# Patient Record
Sex: FEMALE | Race: WHITE | Employment: OTHER | URBAN - METROPOLITAN AREA
[De-identification: names, ages, dates, MRNs, and addresses within clinical notes are randomized per-mention and may not be internally consistent; named-entity substitution may affect disease eponyms.]

---

## 2017-01-01 ENCOUNTER — APPOINTMENT (OUTPATIENT)
Dept: CT IMAGING | Age: 72
DRG: 871 | End: 2017-01-01
Attending: NURSE PRACTITIONER
Payer: MEDICARE

## 2017-01-01 ENCOUNTER — APPOINTMENT (OUTPATIENT)
Dept: GENERAL RADIOLOGY | Age: 72
DRG: 871 | End: 2017-01-01
Attending: EMERGENCY MEDICINE
Payer: MEDICARE

## 2017-01-01 ENCOUNTER — HOSPITAL ENCOUNTER (INPATIENT)
Age: 72
LOS: 1 days | DRG: 871 | End: 2017-03-15
Attending: EMERGENCY MEDICINE | Admitting: INTERNAL MEDICINE
Payer: MEDICARE

## 2017-01-01 ENCOUNTER — APPOINTMENT (OUTPATIENT)
Dept: GENERAL RADIOLOGY | Age: 72
DRG: 871 | End: 2017-01-01
Attending: INTERNAL MEDICINE
Payer: MEDICARE

## 2017-01-01 VITALS
OXYGEN SATURATION: 95 % | SYSTOLIC BLOOD PRESSURE: 62 MMHG | WEIGHT: 188 LBS | BODY MASS INDEX: 33.31 KG/M2 | DIASTOLIC BLOOD PRESSURE: 36 MMHG | HEIGHT: 63 IN | TEMPERATURE: 96.5 F

## 2017-01-01 DIAGNOSIS — E87.20 METABOLIC ACIDOSIS: ICD-10-CM

## 2017-01-01 DIAGNOSIS — N17.9 ACUTE ON CHRONIC RENAL FAILURE (HCC): ICD-10-CM

## 2017-01-01 DIAGNOSIS — J44.9 CHRONIC OBSTRUCTIVE PULMONARY DISEASE, UNSPECIFIED COPD TYPE (HCC): Chronic | ICD-10-CM

## 2017-01-01 DIAGNOSIS — E87.20 LACTIC ACIDOSIS: ICD-10-CM

## 2017-01-01 DIAGNOSIS — I46.9 CARDIAC ARREST (HCC): Primary | ICD-10-CM

## 2017-01-01 DIAGNOSIS — A41.9 SEPTIC SHOCK (HCC): ICD-10-CM

## 2017-01-01 DIAGNOSIS — J96.20 ACUTE ON CHRONIC RESPIRATORY FAILURE, UNSPECIFIED WHETHER WITH HYPOXIA OR HYPERCAPNIA (HCC): ICD-10-CM

## 2017-01-01 DIAGNOSIS — Z85.118 HISTORY OF LUNG CANCER: Chronic | ICD-10-CM

## 2017-01-01 DIAGNOSIS — D64.9 ANEMIA, UNSPECIFIED TYPE: ICD-10-CM

## 2017-01-01 DIAGNOSIS — R91.8 MASS OF RIGHT LUNG: ICD-10-CM

## 2017-01-01 DIAGNOSIS — E87.5 HYPERKALEMIA: ICD-10-CM

## 2017-01-01 DIAGNOSIS — N18.9 ACUTE ON CHRONIC RENAL FAILURE (HCC): ICD-10-CM

## 2017-01-01 DIAGNOSIS — R65.21 SEPTIC SHOCK (HCC): ICD-10-CM

## 2017-01-01 LAB
ALBUMIN SERPL BCP-MCNC: 1.8 G/DL (ref 3.2–4.6)
ALBUMIN/GLOB SERPL: 0.4 {RATIO} (ref 1.2–3.5)
ALP SERPL-CCNC: 161 U/L (ref 50–136)
ALT SERPL-CCNC: 350 U/L (ref 12–65)
ANION GAP BLD CALC-SCNC: 20 MMOL/L (ref 7–16)
ANION GAP BLD CALC-SCNC: 24 MMOL/L (ref 7–16)
ARTERIAL PATENCY WRIST A: ABNORMAL
ARTERIAL PATENCY WRIST A: ABNORMAL
ARTERIAL PATENCY WRIST A: POSITIVE
AST SERPL W P-5'-P-CCNC: 1429 U/L (ref 15–37)
ATRIAL RATE: 89 BPM
BACTERIA SPEC CULT: ABNORMAL
BACTERIA SPEC CULT: ABNORMAL
BASE DEFICIT BLDA-SCNC: 13.7 MMOL/L (ref 0–2)
BASE DEFICIT BLDA-SCNC: 8.7 MMOL/L (ref 0–2)
BASE DEFICIT BLDV-SCNC: 10.3 MMOL/L
BDY SITE: ABNORMAL
BILIRUB SERPL-MCNC: 1.7 MG/DL (ref 0.2–1.1)
BUN SERPL-MCNC: 56 MG/DL (ref 8–23)
BUN SERPL-MCNC: 59 MG/DL (ref 8–23)
CA-I BLD-SCNC: 0.91 MMOL/L (ref 1–1.3)
CA-I BLD-SCNC: 1.03 MMOL/L (ref 1–1.3)
CALCIUM SERPL-MCNC: 7.5 MG/DL (ref 8.3–10.4)
CALCIUM SERPL-MCNC: 8.4 MG/DL (ref 8.3–10.4)
CALCULATED P AXIS, ECG09: 83 DEGREES
CALCULATED R AXIS, ECG10: 104 DEGREES
CALCULATED T AXIS, ECG11: 26 DEGREES
CHLORIDE BLDA-SCNC: 95 MMOL/L (ref 98–106)
CHLORIDE BLDA-SCNC: 98 MMOL/L (ref 98–106)
CHLORIDE SERPL-SCNC: 94 MMOL/L (ref 98–107)
CHLORIDE SERPL-SCNC: 98 MMOL/L (ref 98–107)
CO2 SERPL-SCNC: 18 MMOL/L (ref 21–32)
CO2 SERPL-SCNC: 20 MMOL/L (ref 21–32)
COHGB MFR BLD: 0.1 % (ref 0.5–1.5)
COHGB MFR BLD: 0.2 % (ref 0.5–1.5)
COHGB MFR BLD: 0.3 % (ref 0.5–1.5)
CREAT SERPL-MCNC: 3.96 MG/DL (ref 0.6–1)
CREAT SERPL-MCNC: 4.09 MG/DL (ref 0.6–1)
DIAGNOSIS, 93000: NORMAL
DIASTOLIC BP, ECG02: NORMAL MMHG
DO-HGB BLD-MCNC: 0 % (ref 0–5)
DO-HGB BLD-MCNC: 1 % (ref 0–5)
DO-HGB BLD-MCNC: 59 % (ref 0–5)
ERYTHROCYTE [DISTWIDTH] IN BLOOD BY AUTOMATED COUNT: 17.6 % (ref 11.9–14.6)
FIO2 ON VENT: 100 %
FIO2 ON VENT: 100 %
FIO2 ON VENT: 50 %
GLOBULIN SER CALC-MCNC: 4.1 G/DL (ref 2.3–3.5)
GLUCOSE SERPL-MCNC: 34 MG/DL (ref 65–100)
GLUCOSE SERPL-MCNC: 63 MG/DL (ref 65–100)
HCO3 BLDA-SCNC: 13 MMOL/L (ref 22–26)
HCO3 BLDA-SCNC: 14 MMOL/L (ref 22–26)
HCO3 BLDV-SCNC: 17 MMOL/L
HCT VFR BLD AUTO: 23.8 % (ref 35.8–46.3)
HGB BLD-MCNC: 6.7 G/DL (ref 11.7–15.4)
HGB BLD-MCNC: 7.9 G/DL (ref 11.7–15.4)
HGB BLDMV-MCNC: 8.6 GM/DL (ref 11.7–15)
HGB BLDMV-MCNC: 8.7 GM/DL (ref 11.7–15)
HGB BLDMV-MCNC: 8.8 GM/DL (ref 11.7–15)
LACTATE BLD-SCNC: 14.8 MMOL/L (ref 0.5–1.9)
MAGNESIUM SERPL-MCNC: 2.8 MG/DL (ref 1.8–2.4)
MCH RBC QN AUTO: 27.7 PG (ref 26.1–32.9)
MCHC RBC AUTO-ENTMCNC: 28.2 G/DL (ref 31.4–35)
MCV RBC AUTO: 98.3 FL (ref 79.6–97.8)
METHGB MFR BLD: 0.4 % (ref 0–1.5)
METHGB MFR BLD: 0.7 % (ref 0–1.5)
METHGB MFR BLD: 0.8 % (ref 0–1.5)
OXYHGB MFR BLDA: 98.4 % (ref 94–97)
OXYHGB MFR BLDA: 98.8 % (ref 94–97)
OXYHGB MFR BLDV: 40.1 %
P-R INTERVAL, ECG05: 180 MS
PCO2 BLDA: 18 MMHG (ref 35–45)
PCO2 BLDA: 39 MMHG (ref 35–45)
PCO2 BLDV: 47.2 MMHG
PEEP RESPIRATORY: 8 CM[H2O]
PEEP RESPIRATORY: 8 CM[H2O]
PH BLDA: 7.17 [PH] (ref 7.35–7.45)
PH BLDA: 7.48 [PH] (ref 7.35–7.45)
PH BLDV: 7.18 [PH]
PLATELET # BLD AUTO: 213 K/UL (ref 150–450)
PMV BLD AUTO: 10.7 FL (ref 10.8–14.1)
PO2 BLDA: 262 MMHG (ref 75–100)
PO2 BLDA: 415 MMHG (ref 75–100)
PO2 BLDV: <34 MMHG
POTASSIUM BLDA-SCNC: 5.36 MMOL/L (ref 3.5–5.3)
POTASSIUM BLDA-SCNC: 5.87 MMOL/L (ref 3.5–5.3)
POTASSIUM SERPL-SCNC: 5.4 MMOL/L (ref 3.5–5.1)
POTASSIUM SERPL-SCNC: 5.9 MMOL/L (ref 3.5–5.1)
PROT SERPL-MCNC: 5.9 G/DL (ref 6.3–8.2)
Q-T INTERVAL, ECG07: 432 MS
QRS DURATION, ECG06: 148 MS
QTC CALCULATION (BEZET), ECG08: 525 MS
RBC # BLD AUTO: 2.42 M/UL (ref 4.05–5.25)
RESP RATE: 14
RESP RATE: 16
SAO2 % BLD: 100 % (ref 92–98.5)
SAO2 % BLD: 99 % (ref 92–98.5)
SAO2 % BLDV: 41 %
SERVICE CMNT-IMP: ABNORMAL
SODIUM BLDA-SCNC: 131.7 MMOL/L (ref 135–148)
SODIUM BLDA-SCNC: 131.8 MMOL/L (ref 135–148)
SODIUM SERPL-SCNC: 136 MMOL/L (ref 136–145)
SODIUM SERPL-SCNC: 138 MMOL/L (ref 136–145)
SYSTOLIC BP, ECG01: NORMAL MMHG
TROPONIN I BLD-MCNC: 0.59 NG/ML (ref 0–0.08)
VENTILATION MODE VENT: ABNORMAL
VENTRICULAR RATE, ECG03: 89 BPM
VT SETTING VENT: 450 ML
VT SETTING VENT: 500 ML
WBC # BLD AUTO: 14.4 K/UL (ref 4.3–11.1)

## 2017-01-01 PROCEDURE — 74011250636 HC RX REV CODE- 250/636: Performed by: INTERNAL MEDICINE

## 2017-01-01 PROCEDURE — 74011000250 HC RX REV CODE- 250: Performed by: EMERGENCY MEDICINE

## 2017-01-01 PROCEDURE — 82803 BLOOD GASES ANY COMBINATION: CPT

## 2017-01-01 PROCEDURE — C1751 CATH, INF, PER/CENT/MIDLINE: HCPCS

## 2017-01-01 PROCEDURE — 85027 COMPLETE CBC AUTOMATED: CPT | Performed by: EMERGENCY MEDICINE

## 2017-01-01 PROCEDURE — 94002 VENT MGMT INPAT INIT DAY: CPT

## 2017-01-01 PROCEDURE — 36556 INSERT NON-TUNNEL CV CATH: CPT | Performed by: INTERNAL MEDICINE

## 2017-01-01 PROCEDURE — 86870 RBC ANTIBODY IDENTIFICATION: CPT | Performed by: EMERGENCY MEDICINE

## 2017-01-01 PROCEDURE — 74011250636 HC RX REV CODE- 250/636: Performed by: EMERGENCY MEDICINE

## 2017-01-01 PROCEDURE — 99239 HOSP IP/OBS DSCHRG MGMT >30: CPT | Performed by: INTERNAL MEDICINE

## 2017-01-01 PROCEDURE — 99285 EMERGENCY DEPT VISIT HI MDM: CPT | Performed by: EMERGENCY MEDICINE

## 2017-01-01 PROCEDURE — 5A1935Z RESPIRATORY VENTILATION, LESS THAN 24 CONSECUTIVE HOURS: ICD-10-PCS | Performed by: EMERGENCY MEDICINE

## 2017-01-01 PROCEDURE — 92950 HEART/LUNG RESUSCITATION CPR: CPT | Performed by: EMERGENCY MEDICINE

## 2017-01-01 PROCEDURE — 80053 COMPREHEN METABOLIC PANEL: CPT | Performed by: NURSE PRACTITIONER

## 2017-01-01 PROCEDURE — 86900 BLOOD TYPING SEROLOGIC ABO: CPT | Performed by: EMERGENCY MEDICINE

## 2017-01-01 PROCEDURE — 71010 XR CHEST PORT: CPT

## 2017-01-01 PROCEDURE — 83605 ASSAY OF LACTIC ACID: CPT

## 2017-01-01 PROCEDURE — 05HM33Z INSERTION OF INFUSION DEVICE INTO RIGHT INTERNAL JUGULAR VEIN, PERCUTANEOUS APPROACH: ICD-10-PCS | Performed by: INTERNAL MEDICINE

## 2017-01-01 PROCEDURE — 86902 BLOOD TYPE ANTIGEN DONOR EA: CPT | Performed by: EMERGENCY MEDICINE

## 2017-01-01 PROCEDURE — 74176 CT ABD & PELVIS W/O CONTRAST: CPT

## 2017-01-01 PROCEDURE — 93005 ELECTROCARDIOGRAM TRACING: CPT | Performed by: EMERGENCY MEDICINE

## 2017-01-01 PROCEDURE — 77030012183 HC CATH CV KT TELE -B

## 2017-01-01 PROCEDURE — 96375 TX/PRO/DX INJ NEW DRUG ADDON: CPT | Performed by: EMERGENCY MEDICINE

## 2017-01-01 PROCEDURE — 84484 ASSAY OF TROPONIN QUANT: CPT

## 2017-01-01 PROCEDURE — C8924 2D TTE W OR W/O FOL W/CON,FU: HCPCS

## 2017-01-01 PROCEDURE — 74011000250 HC RX REV CODE- 250: Performed by: INTERNAL MEDICINE

## 2017-01-01 PROCEDURE — 65610000001 HC ROOM ICU GENERAL

## 2017-01-01 PROCEDURE — 36600 WITHDRAWAL OF ARTERIAL BLOOD: CPT

## 2017-01-01 PROCEDURE — 77030031476 HC EXCH HEAT MOISTW FLTR HALY -A

## 2017-01-01 PROCEDURE — 96365 THER/PROPH/DIAG IV INF INIT: CPT | Performed by: EMERGENCY MEDICINE

## 2017-01-01 PROCEDURE — 86920 COMPATIBILITY TEST SPIN: CPT | Performed by: EMERGENCY MEDICINE

## 2017-01-01 PROCEDURE — B543ZZA ULTRASONOGRAPHY OF RIGHT JUGULAR VEINS, GUIDANCE: ICD-10-PCS | Performed by: INTERNAL MEDICINE

## 2017-01-01 PROCEDURE — 87641 MR-STAPH DNA AMP PROBE: CPT | Performed by: INTERNAL MEDICINE

## 2017-01-01 PROCEDURE — 99223 1ST HOSP IP/OBS HIGH 75: CPT | Performed by: INTERNAL MEDICINE

## 2017-01-01 PROCEDURE — 74011000258 HC RX REV CODE- 258: Performed by: INTERNAL MEDICINE

## 2017-01-01 PROCEDURE — 80048 BASIC METABOLIC PNL TOTAL CA: CPT | Performed by: EMERGENCY MEDICINE

## 2017-01-01 PROCEDURE — 36556 INSERT NON-TUNNEL CV CATH: CPT

## 2017-01-01 PROCEDURE — 94003 VENT MGMT INPAT SUBQ DAY: CPT

## 2017-01-01 PROCEDURE — 80051 ELECTROLYTE PANEL: CPT

## 2017-01-01 PROCEDURE — 71250 CT THORAX DX C-: CPT

## 2017-01-01 PROCEDURE — 83735 ASSAY OF MAGNESIUM: CPT | Performed by: EMERGENCY MEDICINE

## 2017-01-01 PROCEDURE — 85018 HEMOGLOBIN: CPT | Performed by: NURSE PRACTITIONER

## 2017-01-01 PROCEDURE — 74011000250 HC RX REV CODE- 250

## 2017-01-01 PROCEDURE — 86905 BLOOD TYPING RBC ANTIGENS: CPT | Performed by: EMERGENCY MEDICINE

## 2017-01-01 RX ORDER — ACETAMINOPHEN 325 MG/1
650 TABLET ORAL
Status: DISCONTINUED | OUTPATIENT
Start: 2017-01-01 | End: 2017-01-01

## 2017-01-01 RX ORDER — MORPHINE SULFATE 2 MG/ML
5 INJECTION, SOLUTION INTRAMUSCULAR; INTRAVENOUS
Status: DISCONTINUED | OUTPATIENT
Start: 2017-01-01 | End: 2017-01-01 | Stop reason: HOSPADM

## 2017-01-01 RX ORDER — VANCOMYCIN HYDROCHLORIDE
1250 ONCE
Status: DISCONTINUED | OUTPATIENT
Start: 2017-01-01 | End: 2017-01-01

## 2017-01-01 RX ORDER — DEXTROSE 50 % IN WATER (D50W) INTRAVENOUS SYRINGE
Status: COMPLETED | OUTPATIENT
Start: 2017-01-01 | End: 2017-01-01

## 2017-01-01 RX ORDER — SODIUM CHLORIDE 9 MG/ML
250 INJECTION, SOLUTION INTRAVENOUS AS NEEDED
Status: DISCONTINUED | OUTPATIENT
Start: 2017-01-01 | End: 2017-01-01

## 2017-01-01 RX ORDER — DEXTROSE 50 % IN WATER (D50W) INTRAVENOUS SYRINGE
Status: COMPLETED
Start: 2017-01-01 | End: 2017-01-01

## 2017-01-01 RX ORDER — NOREPINEPHRINE BITARTRATE/D5W 4MG/250ML
2-16 PLASTIC BAG, INJECTION (ML) INTRAVENOUS ONCE
Status: DISCONTINUED | OUTPATIENT
Start: 2017-01-01 | End: 2017-01-01 | Stop reason: DRUGHIGH

## 2017-01-01 RX ORDER — MIDAZOLAM HYDROCHLORIDE 1 MG/ML
2 INJECTION, SOLUTION INTRAMUSCULAR; INTRAVENOUS
Status: DISCONTINUED | OUTPATIENT
Start: 2017-01-01 | End: 2017-01-01 | Stop reason: HOSPADM

## 2017-01-01 RX ORDER — SODIUM BICARBONATE 1 MEQ/ML
SYRINGE (ML) INTRAVENOUS
Status: COMPLETED | OUTPATIENT
Start: 2017-01-01 | End: 2017-01-01

## 2017-01-01 RX ORDER — DOPAMINE HYDROCHLORIDE 320 MG/100ML
INJECTION, SOLUTION INTRAVENOUS
Status: COMPLETED | OUTPATIENT
Start: 2017-01-01 | End: 2017-01-01

## 2017-01-01 RX ORDER — SODIUM POLYSTYRENE SULFONATE 15 G/60ML
30 SUSPENSION ORAL; RECTAL
Status: DISCONTINUED | OUTPATIENT
Start: 2017-01-01 | End: 2017-01-01

## 2017-01-01 RX ORDER — MIDAZOLAM HYDROCHLORIDE 1 MG/ML
5 INJECTION, SOLUTION INTRAMUSCULAR; INTRAVENOUS
Status: DISCONTINUED | OUTPATIENT
Start: 2017-01-01 | End: 2017-01-01 | Stop reason: HOSPADM

## 2017-01-01 RX ORDER — MORPHINE SULFATE 2 MG/ML
2 INJECTION, SOLUTION INTRAMUSCULAR; INTRAVENOUS
Status: DISCONTINUED | OUTPATIENT
Start: 2017-01-01 | End: 2017-01-01 | Stop reason: HOSPADM

## 2017-01-01 RX ORDER — DEXTROSE 50 % IN WATER (D50W) INTRAVENOUS SYRINGE
25 ONCE
Status: COMPLETED | OUTPATIENT
Start: 2017-01-01 | End: 2017-01-01

## 2017-01-01 RX ORDER — SODIUM CHLORIDE 0.9 % (FLUSH) 0.9 %
5-10 SYRINGE (ML) INJECTION AS NEEDED
Status: DISCONTINUED | OUTPATIENT
Start: 2017-01-01 | End: 2017-01-01

## 2017-01-01 RX ORDER — EPINEPHRINE 0.1 MG/ML
INJECTION INTRACARDIAC; INTRAVENOUS
Status: COMPLETED | OUTPATIENT
Start: 2017-01-01 | End: 2017-01-01

## 2017-01-01 RX ORDER — ATROPINE SULFATE 0.1 MG/ML
INJECTION INTRAVENOUS
Status: COMPLETED | OUTPATIENT
Start: 2017-01-01 | End: 2017-01-01

## 2017-01-01 RX ORDER — ALBUTEROL SULFATE 0.83 MG/ML
2.5 SOLUTION RESPIRATORY (INHALATION)
Status: DISCONTINUED | OUTPATIENT
Start: 2017-01-01 | End: 2017-01-01

## 2017-01-01 RX ORDER — FENTANYL CITRATE 50 UG/ML
25 INJECTION, SOLUTION INTRAMUSCULAR; INTRAVENOUS
Status: DISCONTINUED | OUTPATIENT
Start: 2017-01-01 | End: 2017-01-01

## 2017-01-01 RX ORDER — MIDAZOLAM HYDROCHLORIDE 1 MG/ML
1 INJECTION, SOLUTION INTRAMUSCULAR; INTRAVENOUS
Status: DISCONTINUED | OUTPATIENT
Start: 2017-01-01 | End: 2017-01-01

## 2017-01-01 RX ORDER — MORPHINE SULFATE 10 MG/ML
10 INJECTION, SOLUTION INTRAMUSCULAR; INTRAVENOUS
Status: DISCONTINUED | OUTPATIENT
Start: 2017-01-01 | End: 2017-01-01 | Stop reason: HOSPADM

## 2017-01-01 RX ORDER — SODIUM CHLORIDE 0.9 % (FLUSH) 0.9 %
5-10 SYRINGE (ML) INJECTION EVERY 8 HOURS
Status: DISCONTINUED | OUTPATIENT
Start: 2017-01-01 | End: 2017-01-01

## 2017-01-01 RX ORDER — SODIUM CHLORIDE 9 MG/ML
100 INJECTION, SOLUTION INTRAVENOUS CONTINUOUS
Status: DISCONTINUED | OUTPATIENT
Start: 2017-01-01 | End: 2017-01-01

## 2017-01-01 RX ORDER — MORPHINE SULFATE 2 MG/ML
1 INJECTION, SOLUTION INTRAMUSCULAR; INTRAVENOUS
Status: DISCONTINUED | OUTPATIENT
Start: 2017-01-01 | End: 2017-01-01

## 2017-01-01 RX ORDER — MIDAZOLAM HYDROCHLORIDE 1 MG/ML
2 INJECTION, SOLUTION INTRAMUSCULAR; INTRAVENOUS
Status: DISCONTINUED | OUTPATIENT
Start: 2017-01-01 | End: 2017-01-01

## 2017-01-01 RX ADMIN — NOREPINEPHRINE BITARTRATE 4 MCG/MIN: 1 INJECTION INTRAVENOUS at 18:07

## 2017-01-01 RX ADMIN — DEXTROSE MONOHYDRATE 50 ML: 25 INJECTION, SOLUTION INTRAVENOUS at 14:25

## 2017-01-01 RX ADMIN — PIPERACILLIN SODIUM,TAZOBACTAM SODIUM 4.5 G: 4; .5 INJECTION, POWDER, FOR SOLUTION INTRAVENOUS at 20:00

## 2017-01-01 RX ADMIN — DOPAMINE HYDROCHLORIDE IN DEXTROSE 15 MCG/KG/MIN: 3.2 INJECTION, SOLUTION INTRAVENOUS at 14:31

## 2017-01-01 RX ADMIN — VASOPRESSIN 0.04 UNITS/MIN: 20 INJECTION INTRAVENOUS at 19:41

## 2017-01-01 RX ADMIN — NOREPINEPHRINE BITARTRATE 16 MCG/MIN: 1 INJECTION INTRAVENOUS at 20:11

## 2017-01-01 RX ADMIN — Medication 2 MG: at 02:35

## 2017-01-01 RX ADMIN — DEXTROSE 50 % IN WATER (D50W) INTRAVENOUS SYRINGE 25 G: at 21:15

## 2017-01-01 RX ADMIN — SODIUM CHLORIDE 100 ML/HR: 900 INJECTION, SOLUTION INTRAVENOUS at 18:06

## 2017-01-01 RX ADMIN — EPINEPHRINE 1 MG: 0.1 INJECTION INTRACARDIAC; INTRAVENOUS at 14:55

## 2017-01-01 RX ADMIN — EPINEPHRINE 1 MG: 0.1 INJECTION, SOLUTION ENDOTRACHEAL; INTRACARDIAC; INTRAVENOUS at 14:24

## 2017-01-01 RX ADMIN — MIDAZOLAM 2 MG: 1 INJECTION INTRAMUSCULAR; INTRAVENOUS at 21:51

## 2017-01-01 RX ADMIN — MIDAZOLAM HYDROCHLORIDE 2 MG: 1 INJECTION, SOLUTION INTRAMUSCULAR; INTRAVENOUS at 02:35

## 2017-01-01 RX ADMIN — Medication 2 MG: at 21:51

## 2017-01-01 RX ADMIN — PERFLUTREN 1 ML: 6.52 INJECTION, SUSPENSION INTRAVENOUS at 15:00

## 2017-01-01 RX ADMIN — DEXTROSE MONOHYDRATE 25 G: 25 INJECTION, SOLUTION INTRAVENOUS at 21:15

## 2017-01-01 RX ADMIN — VANCOMYCIN HYDROCHLORIDE 1250 MG: 10 INJECTION, POWDER, LYOPHILIZED, FOR SOLUTION INTRAVENOUS at 20:00

## 2017-01-01 RX ADMIN — SODIUM BICARBONATE 50 MEQ: 84 INJECTION, SOLUTION INTRAVENOUS at 14:25

## 2017-01-01 RX ADMIN — Medication 5 MG: at 02:59

## 2017-01-01 RX ADMIN — ATROPINE SULFATE 1 MG: 0.1 INJECTION, SOLUTION INTRAVENOUS at 14:24

## 2017-02-13 PROBLEM — R32 INCONTINENCE: Status: ACTIVE | Noted: 2017-01-01

## 2017-02-13 PROBLEM — R49.0 HOARSENESS: Status: ACTIVE | Noted: 2017-01-01

## 2017-02-13 PROBLEM — I10 ESSENTIAL HYPERTENSION: Status: ACTIVE | Noted: 2017-01-01

## 2017-02-13 PROBLEM — Z85.118 HISTORY OF LUNG CANCER: Status: ACTIVE | Noted: 2017-01-01

## 2017-02-13 PROBLEM — F41.9 ANXIETY: Status: ACTIVE | Noted: 2017-01-01

## 2017-02-13 PROBLEM — I73.9 PVD (PERIPHERAL VASCULAR DISEASE) (HCC): Status: ACTIVE | Noted: 2017-01-01

## 2017-02-13 PROBLEM — E78.00 HYPERCHOLESTEREMIA: Status: ACTIVE | Noted: 2017-01-01

## 2017-02-13 PROBLEM — R09.89 SINUS COMPLAINT: Status: ACTIVE | Noted: 2017-01-01

## 2017-02-13 PROBLEM — H54.7 VISION PROBLEMS: Status: ACTIVE | Noted: 2017-01-01

## 2017-02-13 PROBLEM — M25.571 ARTHRALGIA OF RIGHT FOOT: Status: ACTIVE | Noted: 2017-01-01

## 2017-02-13 PROBLEM — R06.02 SHORTNESS OF BREATH: Status: ACTIVE | Noted: 2017-01-01

## 2017-02-13 PROBLEM — J44.1 CHRONIC OBSTRUCTIVE PULMONARY DISEASE WITH ACUTE EXACERBATION (HCC): Status: ACTIVE | Noted: 2017-01-01

## 2017-02-13 PROBLEM — R53.1 WEAKNESS: Status: ACTIVE | Noted: 2017-01-01

## 2017-02-13 PROBLEM — R23.3 EASY BRUISING: Status: ACTIVE | Noted: 2017-01-01

## 2017-02-13 PROBLEM — R53.82 CHRONIC FATIGUE: Status: ACTIVE | Noted: 2017-01-01

## 2017-02-13 PROBLEM — N18.9 CKD (CHRONIC KIDNEY DISEASE): Status: ACTIVE | Noted: 2017-01-01

## 2017-02-13 PROBLEM — I50.9 CHF (CONGESTIVE HEART FAILURE) (HCC): Status: ACTIVE | Noted: 2017-01-01

## 2017-02-13 PROBLEM — R05.9 COUGH: Status: ACTIVE | Noted: 2017-01-01

## 2017-02-13 PROBLEM — R63.5 WEIGHT GAIN: Status: ACTIVE | Noted: 2017-01-01

## 2017-02-13 PROBLEM — G47.9 DIFFICULTY SLEEPING: Status: ACTIVE | Noted: 2017-01-01

## 2017-02-13 PROBLEM — I25.10 CAD (CORONARY ARTERY DISEASE): Status: ACTIVE | Noted: 2017-01-01

## 2017-02-14 PROBLEM — G89.29 CHRONIC PAIN OF BOTH KNEES: Status: ACTIVE | Noted: 2017-01-01

## 2017-02-14 PROBLEM — M25.561 CHRONIC PAIN OF BOTH KNEES: Status: ACTIVE | Noted: 2017-01-01

## 2017-02-14 PROBLEM — M25.562 CHRONIC PAIN OF BOTH KNEES: Status: ACTIVE | Noted: 2017-01-01

## 2017-03-14 PROBLEM — R06.02 SHORTNESS OF BREATH: Chronic | Status: ACTIVE | Noted: 2017-01-01

## 2017-03-14 PROBLEM — R65.21 SEPTIC SHOCK (HCC): Status: ACTIVE | Noted: 2017-01-01

## 2017-03-14 PROBLEM — R91.8 MASS OF RIGHT LUNG: Status: ACTIVE | Noted: 2017-01-01

## 2017-03-14 PROBLEM — F41.9 ANXIETY: Chronic | Status: ACTIVE | Noted: 2017-01-01

## 2017-03-14 PROBLEM — Z85.118 HISTORY OF LUNG CANCER: Chronic | Status: ACTIVE | Noted: 2017-01-01

## 2017-03-14 PROBLEM — E66.9 OBESITY (BMI 30-39.9): Chronic | Status: ACTIVE | Noted: 2017-01-01

## 2017-03-14 PROBLEM — R53.1 WEAKNESS: Chronic | Status: ACTIVE | Noted: 2017-01-01

## 2017-03-14 PROBLEM — I10 ESSENTIAL HYPERTENSION: Chronic | Status: ACTIVE | Noted: 2017-01-01

## 2017-03-14 PROBLEM — G47.9 DIFFICULTY SLEEPING: Chronic | Status: ACTIVE | Noted: 2017-01-01

## 2017-03-14 PROBLEM — I46.9 CARDIAC ARREST (HCC): Status: ACTIVE | Noted: 2017-01-01

## 2017-03-14 PROBLEM — J44.9 COPD (CHRONIC OBSTRUCTIVE PULMONARY DISEASE) (HCC): Chronic | Status: ACTIVE | Noted: 2017-01-01

## 2017-03-14 PROBLEM — E87.20 METABOLIC ACIDOSIS: Status: ACTIVE | Noted: 2017-01-01

## 2017-03-14 PROBLEM — E87.5 HYPERKALEMIA: Status: ACTIVE | Noted: 2017-01-01

## 2017-03-14 PROBLEM — I25.10 CAD (CORONARY ARTERY DISEASE): Chronic | Status: ACTIVE | Noted: 2017-01-01

## 2017-03-14 PROBLEM — N17.9 ACUTE ON CHRONIC RENAL FAILURE (HCC): Status: ACTIVE | Noted: 2017-01-01

## 2017-03-14 PROBLEM — I50.9 CHF (CONGESTIVE HEART FAILURE) (HCC): Chronic | Status: ACTIVE | Noted: 2017-01-01

## 2017-03-14 PROBLEM — I73.9 PVD (PERIPHERAL VASCULAR DISEASE) (HCC): Chronic | Status: ACTIVE | Noted: 2017-01-01

## 2017-03-14 PROBLEM — H54.7 VISION PROBLEMS: Chronic | Status: ACTIVE | Noted: 2017-01-01

## 2017-03-14 PROBLEM — E78.00 HYPERCHOLESTEREMIA: Chronic | Status: ACTIVE | Noted: 2017-01-01

## 2017-03-14 PROBLEM — N18.9 ACUTE ON CHRONIC RENAL FAILURE (HCC): Status: ACTIVE | Noted: 2017-01-01

## 2017-03-14 PROBLEM — G89.29 CHRONIC PAIN OF BOTH KNEES: Chronic | Status: ACTIVE | Noted: 2017-01-01

## 2017-03-14 PROBLEM — M25.562 CHRONIC PAIN OF BOTH KNEES: Chronic | Status: ACTIVE | Noted: 2017-01-01

## 2017-03-14 PROBLEM — R32 INCONTINENCE: Chronic | Status: ACTIVE | Noted: 2017-01-01

## 2017-03-14 PROBLEM — R53.82 CHRONIC FATIGUE: Chronic | Status: ACTIVE | Noted: 2017-01-01

## 2017-03-14 PROBLEM — M25.561 CHRONIC PAIN OF BOTH KNEES: Chronic | Status: ACTIVE | Noted: 2017-01-01

## 2017-03-14 PROBLEM — M25.571 ARTHRALGIA OF RIGHT FOOT: Chronic | Status: ACTIVE | Noted: 2017-01-01

## 2017-03-14 PROBLEM — N18.9 CKD (CHRONIC KIDNEY DISEASE): Chronic | Status: ACTIVE | Noted: 2017-01-01

## 2017-03-14 PROBLEM — D64.9 ANEMIA: Status: ACTIVE | Noted: 2017-01-01

## 2017-03-14 PROBLEM — J96.20 ACUTE ON CHRONIC RESPIRATORY FAILURE (HCC): Status: ACTIVE | Noted: 2017-01-01

## 2017-03-14 PROBLEM — R23.3 EASY BRUISING: Chronic | Status: ACTIVE | Noted: 2017-01-01

## 2017-03-14 PROBLEM — A41.9 SEPTIC SHOCK (HCC): Status: ACTIVE | Noted: 2017-01-01

## 2017-03-14 NOTE — PROGRESS NOTES
Pharmacokinetic Consult to Pharmacist    Nehal Jakub is a 66 y.o. female being treated for sepsis with vancomycin and zosyn. Height: 5' 3\" (160 cm) (from previous entry)  Weight: 85.3 kg (188 lb) (from previous entry)  Lab Results   Component Value Date/Time    BUN 56 03/14/2017 03:30 PM    Creatinine 4.09 03/14/2017 03:30 PM    WBC 14.4 03/14/2017 02:15 PM      Estimated Creatinine Clearance: 12.7 mL/min (based on Cr of 4.09). CULTURES:  pending    Day 1 of vancomycin. Goal trough is 15-20. Vancomycin dose initiated at 1250 mg x 1, then will order random levels when clinically indicated. Will continue to follow patient.       Thank you,  Jason Scott, PharmD  Clinical Pharmacist  [phone]

## 2017-03-14 NOTE — PROGRESS NOTES
Ventilator check complete; patient has a #7. 0 ET tube secured at the 24 at the lip. Patient is not sedated. Patient is not able to follow commands. Breath sounds are diminished. Trachea is midline, Negative for subcutaneous air, and chest excursion is symmetric. Patient is also Negative for cyanosis and is Negative for pitting edema. All alarms are set and audible. Resuscitation bag is at the head of the bed. Ventilator Settings  Mode FIO2 Rate Tidal Volume Pressure PEEP I:E Ratio                           Peak airway pressure:     Minute ventilation:       ABG: No results for input(s): PH, PCO2, PO2, HCO3 in the last 72 hours.       Alissa Sanchez, RT

## 2017-03-14 NOTE — ED TRIAGE NOTES
Pt brought by EMS. Pt was walking up to house and collapsed. No CPR done by family. FD performed chest compressions approx 30 seconds before EMS came on scene. EMS stated pt was asystole when they arrived on scene. EMS stated no shocks were given, but a total of 2 epi's were given. 18 gauge IV in right, 20 gauge in right AC placed by EMS.

## 2017-03-14 NOTE — PROGRESS NOTES
I was called from the ER to respond to  a cardiac arrest. I cared for patient's family members while the medical team was assisting the patient. I provided direction, support, communication and prayer to the family. Patient was transferred to ICU.       [employer]

## 2017-03-14 NOTE — CONSULTS
[address] Cardiology Consult                Date of  Admission: 3/14/2017  2:22 PM     Primary Care Physician: Dr. Nighat Jorgensen  Primary Cardiologist: Dr. Alex Lin [facility])  Referring Physician: Dr. Dian Bahena Physician: Dr. Johnnie Bain    CC/Reason for consult: cardiac arrest      Jonathan Marmolejo is a 66 y.o. female who was ambulating into her house today when she collapsed. EMS was summoned. Fire dept initiated CPR. She was found to be in asystole on EMS arrival. She had ROSC but remained unresponsive. She arrested again after EMS arrival and was resuscitated again. 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 She has had lung cancer that was reportedly in remission. She has continued to smoke. She had a nuclear stress test and echo in 7/16 with normal LV EF and no ischemia. Carotid US in 8/16 showed less than 50% stenosis bilaterally. She followed up with Dr. Alex Lin in 9/16 with plans to follow up in one year. Patient Active Problem List   Diagnosis Code    Chronic obstructive pulmonary disease with acute exacerbation ([facility]) J44.1    Anxiety F41.9    Hypercholesteremia E78.00    Essential hypertension I10    Vision problems H54.7    Sinus complaint R09.89    Hoarseness R49.0    Cough R05    Shortness of breath R06.02    Incontinence R32    Easy bruising R23.8    Weakness R53.1    Chronic fatigue R53.82    Weight gain R63.5    Difficulty sleeping G47.9    CKD (chronic kidney disease) N18.9    History of lung cancer Z85. 80    PVD (peripheral vascular disease) ([facility]) I73.9    Arthralgia of right foot M25.571    CAD (coronary artery disease) I25.10    CHF (congestive heart failure) ([facility]) I50.9    Chronic pain of both knees M25.561, M25.562, G89.29       Past Medical History:   Diagnosis Date    Anemia     Aortic stenosis, mild     per echo 1/14    Chronic kidney disease has stent that drains left kidney into bag- directly inserted in left kidney. had damage to right kidney after AAA repair. stent was \"left in for 10 years and it dried up my kidney\"     Chronic obstructive pulmonary disease ([address]     on continuous O2 per NC since 1/2017    GERD (gastroesophageal reflux disease)     H/O cardiac murmur     Hiatal hernia     History of AAA (abdominal aortic aneurysm) repair 2002    Hypertension     Macular degeneration due to cyst or hole bilateral    MI, old 2002, 2006    OCD (obsessive compulsive disorder)     Pulmonary hypertension, mild ([address]     per echo 1/14      Past Surgical History:   Procedure Laterality Date    HX AAA REPAIR  2002    HX APPENDECTOMY  1963    HX CATARACT REMOVAL Bilateral     with IOL    HX FRACTURE TX Right 1993    arm with plating    HX LAP CHOLECYSTECTOMY      HX LOBECTOMY Left     lower lobe removed    HX KAYLIN AND BSO      HX UROLOGICAL      stent to left kidney- to urinary bag    HX UROLOGICAL      stent inserted in right kidney during AAA surgery and was accidentely left in for 10 years. caused kidney damage    HX VEIN STRIPPING Right 1970's     Allergies   Allergen Reactions    Combigan [Brimonidine-Timolol] Other (comments)     Eye inflammation. Eyes water and hurt    Morphine Other (comments)     \" drives me crazy\"  hallucinates      Family History   Problem Relation Age of Onset    Cancer Father     Heart Disease Father     Cancer Brother     Heart Disease Brother     Lung Disease Brother     Heart Disease Brother         Current Facility-Administered Medications   Medication Dose Route Frequency    norepinephrine (LEVOPHED) 4 mg in dextrose 5% 250 mL infusion  2-16 mcg/min IntraVENous ONCE     Current Outpatient Prescriptions   Medication Sig    tiotropium (SPIRIVA WITH HANDIHALER) 18 mcg inhalation capsule Take 1 Cap by inhalation daily.  MV-MN/IRON FUM/FA/OMEGA3,6,9#3 (WOMEN'S MULTI PO) Take  by mouth.     ferrous sulfate (IRON) 325 mg (65 mg iron) cpER Take  by mouth.  montelukast (SINGULAIR) 10 mg tablet Take 10 mg by mouth daily.  traMADol (ULTRAM) 50 mg tablet Take 50 mg by mouth every eight (8) hours as needed for Pain.  polyethylene glycol (MIRALAX) 17 gram packet Take 17 g by mouth daily.  OXYGEN-AIR DELIVERY SYSTEMS 2 L by Nasal route continuous.  melatonin 1 mg tablet Take 1 mg by mouth nightly.  escitalopram oxalate (LEXAPRO) 20 mg tablet Take 1 Tab by mouth daily.  carvedilol (COREG) 12.5 mg tablet Take 1 Tab by mouth two (2) times daily (with meals).  furosemide (LASIX) 40 mg tablet Take 1 Tab by mouth two (2) times a day.  divalproex DR (DEPAKOTE) 125 mg tablet Take 1 Tab by mouth two (2) times a day.  Omeprazole delayed release (PRILOSEC D/R) 20 mg tablet Take 1 Tab by mouth every morning. Indications: GASTROESOPHAGEAL REFLUX    simvastatin (ZOCOR) 40 mg tablet Take 1 Tab by mouth nightly. Indications: hypercholesterolemia    OLANZapine (ZYPREXA) 20 mg tablet Take 0.5 Tabs by mouth nightly. Indications: ocd    hydralazine (APRESOLINE) 50 mg tablet Take 50 mg by mouth three (3) times daily. Indications: HYPERTENSION    lisinopril (PRINIVIL, ZESTRIL) 10 mg tablet Take 10 mg by mouth every morning. Indications: HYPERTENSION    metoprolol (LOPRESSOR) 50 mg tablet Take 25 mg by mouth two (2) times a day. Indications: HYPERTENSION    IRON PS CMPLX/VIT B12/FA (FERREX 150 FORTE PO) Take  by mouth every morning. Indications: anemia    aspirin (ASPIRIN) 325 mg tablet Take 325 mg by mouth every morning. Indications: MYOCARDIAL REINFARCTION PREVENTION, hold until after surgery per MD and surgeon. ok with Dr. Harika Marquez fluticasone-salmeterol (ADVAIR DISKUS) 250-50 mcg/dose diskus inhaler Take 1 puff by inhalation every twelve (12) hours. Indications: BRONCHOSPASM PREVENTION WITH COPD    cyanocobalamin (VITAMIN B-12) 1,000 mcg tablet Take 1,000 mcg by mouth every morning.  Indications: PREVENTION OF VITAMIN B12 DEFICIENCY    ascorbic acid (VITAMIN C) 500 mg tablet Take 500 mg by mouth every morning. Indications: VITAMIN C DEFICIENCY    albuterol (PROAIR HFA) 90 mcg/actuation inhaler Take 2 puffs by inhalation every four (4) hours as needed for Wheezing. Indications: CHRONIC OBSTRUCTIVE PULMONARY DISEASE    nitroglycerin (NITROSTAT) 0.4 mg SL tablet 0.4 mg by SubLINGual route every five (5) minutes as needed for Chest Pain. Review of Systems   Unable to perform ROS: patient unresponsive          Physical Exam  Vitals:    03/14/17 1434 03/14/17 1436 03/14/17 1438 03/14/17 1443   BP:    (!) 80/60   Pulse: 84 (!) 107 (!) 107    Resp: 16 29 18    SpO2:   95%        Physical Exam:  General: Well Developed, Obese, No Acute Distress  Neck: supple, no JVD  Heart: S1S2 with tachycardic rate, regular rhythm  Lungs: Clear throughout auscultation bilaterally  Abd: soft, nondistended  Ext: warm, no edema  Skin: warm and dry  Psychiatric: Unresponsive   Neurologic: Unresponsive    Cardiographics    Telemetry: sinus tachycardia  ECG: sinus tachycardia, RBBB  Echocardiogram: report pending    Labs:   Recent Labs      03/14/17   1437   TNIPOC  0.59*       Assessment/Plan:      Active Problems:    * Cardiac arrest-initial rhythm asystole, pt with multiple medical problems, hypotensive requiring pressors, on vent, lactic acid elevated, severely anemic, bedside echo with preserved RV and LV function, poor prognosis, trend troponins, BMP pending, no indication for LHC at this time      Lactic acidosis      Anemia-unknown etiology, for transfusion      Hypotension-currently requiring pressor support      Thank you very much for this referral. We appreciate the opportunity to participate in this patient's care. Kajal Blank PA-C  Consulting MD: Dr. Kerry Urias     3/14/2017     8:56 PM    I have personally seen and examined Sheryl Annetta with [facility].   I agree and confirm findings in history, physical exam, and assessment/plan as outlined above with following pertinent additions/exceptions:  Patient is a 77-year-old female who presented with a [facility] arrest.  She has an uncertain cardiac history as reportedly she has a history of myocardial infarction but does undergo cardiac catheterization without any revascularization and underwent a stress test in July of 2016 which showed normal perfusion and no evidence of ischemia. She also had an echocardiogram in July which was normal.  She has been followed by a cardiologist at [facility] and [facility]. She's never been seen in our system. According to the family, she was walking upstairs when she became weak and unable to continue. Her daughter-in-law went to get her walker and when she returned the patient became unresponsive and fell to her knees. They activated EMS and were instructed to allow her on the ground. She was unresponsive this point. She underwent CPR for at least 10 minutes with her daughter-in-law and when EMS arrived underwent another 10 minutes of resuscitation before return of spontaneous circulation. In the emergency room she has had a bradycardic arrest requiring epinephrine and is currently all dopamine. Her echocardiogram shows preserved LV and RV function. It is a poor quality study but there is no evidence of wall motion abnormalities. She is markedly anemic all her labs. Her daughter denies any complaints of chest pain, dyspnea cough prior to the event. PE:  Intubated WF   CV: RRR with I/VI CELIA  L: Coarse breath sounds. E: no edema. ASS/Plan:  As above. Uncertain etiology of her arrest and continued instability. No evidence of ST elevation or acute ischemia on EKG/echo/Labs. We'll continue supportive care with inotropic support, check cardiac enzymes and monitor on telemetry. CT scan of abdomen and chest to evaluate for etiology of arrest and anemia. Jos Thomas MD

## 2017-03-14 NOTE — ED PROVIDER NOTES
HPI Comments: 75-year-old female was at home walking up a ramp in her house  She collapsed. EMS was called. About a 10 minute pre-arrival downtime. On EMS arrival she was in asystole. She was intubated. Given 2 doses of epi. And high quality CPR. After 15 minutes of resuscitation that had a return of spontaneous circulation    She was transported intubated. No return of consciousness. Agonal respirations throughout. Blood pressure heart rate stable throughout transport    She is unable to provide any history. There is no family at the bedside at this time. Past Medical History:   Diagnosis Date    Anemia     Aortic stenosis, mild     per echo 1/14    Chronic kidney disease     has stent that drains left kidney into bag- directly inserted in left kidney. had damage to right kidney after AAA repair. stent was \"left in for 10 years and it dried up my kidney\"     Chronic obstructive pulmonary disease ([address]     on continuous O2 per NC since 1/2017    GERD (gastroesophageal reflux disease)     H/O cardiac murmur     Hiatal hernia     History of AAA (abdominal aortic aneurysm) repair 2002    Hypertension     Macular degeneration due to cyst or hole bilateral    MI, old 2002, 2006    OCD (obsessive compulsive disorder)     Pulmonary hypertension, mild ([address]     per echo 1/14       Past Surgical History:   Procedure Laterality Date    HX AAA REPAIR  2002    HX APPENDECTOMY  1963    HX CATARACT REMOVAL Bilateral     with IOL    HX FRACTURE TX Right 1993    arm with plating    HX LAP CHOLECYSTECTOMY      HX LOBECTOMY Left     lower lobe removed    HX KAYLIN AND BSO      HX UROLOGICAL      stent to left kidney- to urinary bag    HX UROLOGICAL      stent inserted in right kidney during AAA surgery and was accidentely left in for 10 years.  caused kidney damage    HX VEIN STRIPPING Right 1970's         Family History:   Problem Relation Age of Onset    Cancer Father     Heart Disease Father  Cancer Brother     Heart Disease Brother     Lung Disease Brother     Heart Disease Brother        Social History     Social History    Marital status: LEGALLY      Spouse name: N/A    Number of children: N/A    Years of education: N/A     Occupational History    Not on file. Social History Main Topics    Smoking status: Former Smoker     Packs/day: 4.00     Years: 55.00     Types: Cigarettes    Smokeless tobacco: Never Used      Comment: currently smokes about a pack a day    Alcohol use No    Drug use: No    Sexual activity: Not on file     Other Topics Concern    Not on file     Social History Narrative         ALLERGIES: Combigan [brimonidine-timolol] and Morphine    Review of Systems   Unable to perform ROS: Acuity of condition       Vitals:    03/14/17 1432 03/14/17 1434   Pulse: 92 84   Resp:  16            Physical Exam   Constitutional:   Elderly, intubated female   HENT:   Head: Normocephalic and atraumatic. Patient has had bilateral eye surgeries. Her pupils aren't dilated. Nonreactive or very sluggish at this time. Cardiovascular:   Sinus rhythm. Pulmonary/Chest:   Bilateral breath sounds present with bagging. Abdominal:   Abdomen is distended   Neurological:   Patient is unresponsive   Skin:   Skin is pale, cool   Psychiatric:   Unable to assess   Nursing note and vitals reviewed. MDM  Number of Diagnoses or Management Options  Diagnosis management comments: 77-year-old female patient of [place] cardiology with extensive medical history including coronary disease lung cancer recently having a renal stent    ?  PE, ?? ICH    During initial eval pt had recurrent bradycardic arrest  Given epi/ atropine/ d50/ bicarb -- and 2 minutes of high quality CPR with return or circulation    Consult to Dr. Ceja How to Dr. Lauro Garcia and XR pending  ABG pending    ===================================================================  This patient is critically ill and there is a high probability of of imminent or life threatening deterioration in the patient's condition without immediate management. The nature of the patient's clinical problem is: arrest    I have spent 35 minutes in direct patient care, documentation, review of labs/xrays/old records, discussion with Staff, Colleague . The time involved in the performance of separately reportable procedures was not counted toward critical care time.      Sterling Santacruz MD; 3/14/2017 @2:43 PM  ===================================================================           Amount and/or Complexity of Data Reviewed  Clinical lab tests: ordered  Tests in the radiology section of CPT®: ordered  Tests in the medicine section of CPT®: ordered  Obtain history from someone other than the patient: yes  Discuss the patient with other providers: yes  Independent visualization of images, tracings, or specimens: yes    Risk of Complications, Morbidity, and/or Mortality  Presenting problems: high  Management options: high      ED Course   Patient's markedly elevated lactic acid of 14.8 is not due to an infection and she is not suffering from severe sepsis or septic shock    Procedures

## 2017-03-14 NOTE — PROGRESS NOTES
TRANSFER - IN REPORT:    Verbal report received from Ros(name) on Willie Tadeo  being received from ED(unit) for routine progression of care      Report consisted of patients Situation, Background, Assessment and   Recommendations(SBAR). Information from the following report(s) SBAR was reviewed with the receiving nurse. Opportunity for questions and clarification was provided. Assessment completed upon patients arrival to unit and care assumed.

## 2017-03-14 NOTE — PROGRESS NOTES
Patient was received from the ED and placed on ICU's monitors. We were unable to get a BP at this time, ED RN at bedside said it was difficult in the ED as well. We attempted multiple sites and BP cuffs and could not obtain one. We were unable to obtain a o2 sat as well. We had to manually bag the patient as our sats were appearing low on the monitor. ABG performed and vent adjusted accordingly. We got some intermittent BPs. Patient was then maxed out on Levophed. MD came to bedside to insert a central line which was also difficult as patient is hard to doppler pulses. A freedman was inserted and patient has had no urine output. Bladder scan shows no urine in bladder. Patient's eyes have scleral edema and are unreactive to light. Left pupil measures to about 6mm and the right pupil about 3mm. Patient has no gag reflex when inline suctioned on the vent. Patient was bathed with CHG wipes and dual skin assessment performed with Abbi RN. Patient has scattered ecchymosis and skin tears. Sacrum intact, Allevyn placed for wound prevention. Her belly is distended and no bowel sounds can be heard. Patient's family is currently at bedside and has been updated by MD. She is currently on max doses of levophed and vasopressin, MD aware. Bedside shift report was given to Escobar night shift RN.

## 2017-03-14 NOTE — H&P
HISTORY AND PHYSICAL      Sachin Castillo    3/14/2017    Date of Admission:  3/14/2017    The patient's chart is reviewed and the patient is discussed with the staff. Subjective:     Patient is a 66 y.o.  female was transported to the ER after she collapsed at home. She was coming up the steps at home where she collapsed to her knees. Her daughter in law was home and witnessed her going down so called EMS. The daughter in law could not see any respiratory efforts so CPR was started with the direction of the . Downtime was estimated to be 10 minutes before EMS arrived. She was found to be in asystole so was intubated. CPR was continued for about 15 minutes before ROSC. She is followed by Dr. Yari Montes at [facility] and she had a stress test last year. No recent catheterization. She has chronic lower extremity edema and is maintained on 40 mg of lasix BID. In the ER, she was found to be hypotensive. She is currently on Dopamine. She has a hemoglobin of 6.7. A transfusion has been ordered. The family was interviewed. They are unaware of any recent bleeding. She has had noted hematuria in the recent past associated with nephrostomy tube placement. No history of GI bleeding. She takes iron, Asa and Prilosec at home. She has oxygen dependent COPD and is followed by Dr. Asa Tubbs at [facility]. She is maintained on Spiriva, Advair and Albuterol. She has chronic dyspnea with exertion. She had lung cancer in 2006 which was resected with no additional treatment needed. She was smoking up until December 2016.      Review of Systems  A comprehensive review of systems was negative except for: Constitutional: positive for recent weight gain per review of care everywhere  Eyes: positive for visual disturbance secondary to macular degeneration  Ears, nose, mouth, throat, and face: positive for history of hoarseness  Respiratory: positive for chronic dyspnea with oxygen dependence  Cardiovascular: positive for lower extremity edema  Gastrointestinal: positive for hiatel hernia with reflux symptoms  Genitourinary: positive for history of urinary infections, chronic kidney disease with previous stents  Integument/breast: positive for none  Hematologic/lymphatic: positive for none  Musculoskeletal: positive for arthritic pain  Neurological: positive for weakness - uses walker to ambulate  Behvioral/Psych: positive for anxiety and obesity  Endocrine: positive for none  Allergic/Immunologic: positive for none    Patient Active Problem List   Diagnosis Code    Anxiety F41.9    Hypercholesteremia E78.00    Essential hypertension I10    Shortness of breath R06.02    Incontinence R32    Easy bruising R23.8    Weakness R53.1    Chronic fatigue R53.82    Difficulty sleeping G47.9    CKD (chronic kidney disease) N18.9    History of lung cancer Z85. 80    PVD (peripheral vascular disease) ([facility]) I73.9    Arthralgia of right foot M25.571    CAD (coronary artery disease) I25.10    CHF (congestive heart failure) ([facility]) I50.9    Chronic pain of both knees M25.561, M25.562, G89.29    Acute on chronic respiratory failure ([facility]) J96.20    Cardiac arrest ([facility]) I46.9    Pulmonary hypertension, mild ([facility]) I27.2    OCD (obsessive compulsive disorder) F42.9    Macular degeneration due to cyst or hole H35.349    Hiatal hernia K44.9    GERD (gastroesophageal reflux disease) K21.9    Aortic stenosis, mild I35.0    COPD (chronic obstructive pulmonary disease) ([facility]) J44.9    Anemia D64.9    Hyperkalemia E87.5    Obesity (BMI 30-39. 9) E66.9       Prior to Admission Medications   Prescriptions Last Dose Informant Patient Reported? Taking? IRON PS CMPLX/VIT B12/FA (FERREX 150 FORTE PO)   Yes No   Sig: Take  by mouth every morning. Indications: anemia   MV-MN/IRON FUM/FA/OMEGA3,6,9#3 (WOMEN'S MULTI PO)   Yes No   Sig: Take  by mouth. OLANZapine (ZYPREXA) 20 mg tablet   No No   Sig: Take 0.5 Tabs by mouth nightly. Indications: ocd   OXYGEN-AIR DELIVERY SYSTEMS   Yes No   Si L by Nasal route continuous. Omeprazole delayed release (PRILOSEC D/R) 20 mg tablet   No No   Sig: Take 1 Tab by mouth every morning. Indications: GASTROESOPHAGEAL REFLUX   albuterol (PROAIR HFA) 90 mcg/actuation inhaler   Yes No   Sig: Take 2 puffs by inhalation every four (4) hours as needed for Wheezing. Indications: CHRONIC OBSTRUCTIVE PULMONARY DISEASE   ascorbic acid (VITAMIN C) 500 mg tablet   Yes No   Sig: Take 500 mg by mouth every morning. Indications: VITAMIN C DEFICIENCY   aspirin (ASPIRIN) 325 mg tablet   Yes No   Sig: Take 325 mg by mouth every morning. Indications: MYOCARDIAL REINFARCTION PREVENTION, hold until after surgery per MD and surgeon. ok with Dr. Graciela Ospina   carvedilol (COREG) 12.5 mg tablet   No No   Sig: Take 1 Tab by mouth two (2) times daily (with meals). cyanocobalamin (VITAMIN B-12) 1,000 mcg tablet   Yes No   Sig: Take 1,000 mcg by mouth every morning. Indications: PREVENTION OF VITAMIN B12 DEFICIENCY   divalproex DR (DEPAKOTE) 125 mg tablet   No No   Sig: Take 1 Tab by mouth two (2) times a day. escitalopram oxalate (LEXAPRO) 20 mg tablet   No No   Sig: Take 1 Tab by mouth daily. ferrous sulfate (IRON) 325 mg (65 mg iron) cpER   Yes No   Sig: Take  by mouth. fluticasone-salmeterol (ADVAIR DISKUS) 250-50 mcg/dose diskus inhaler   Yes No   Sig: Take 1 puff by inhalation every twelve (12) hours. Indications: BRONCHOSPASM PREVENTION WITH COPD   furosemide (LASIX) 40 mg tablet   No No   Sig: Take 1 Tab by mouth two (2) times a day. hydralazine (APRESOLINE) 50 mg tablet   Yes No   Sig: Take 50 mg by mouth three (3) times daily. Indications: HYPERTENSION   lisinopril (PRINIVIL, ZESTRIL) 10 mg tablet   Yes No   Sig: Take 10 mg by mouth every morning. Indications: HYPERTENSION   melatonin 1 mg tablet   Yes No   Sig: Take 1 mg by mouth nightly.    metoprolol (LOPRESSOR) 50 mg tablet   Yes No   Sig: Take 25 mg by mouth two (2) times a day. Indications: HYPERTENSION   montelukast (SINGULAIR) 10 mg tablet   Yes No   Sig: Take 10 mg by mouth daily. nitroglycerin (NITROSTAT) 0.4 mg SL tablet   Yes No   Si.4 mg by SubLINGual route every five (5) minutes as needed for Chest Pain.   polyethylene glycol (MIRALAX) 17 gram packet   Yes No   Sig: Take 17 g by mouth daily. simvastatin (ZOCOR) 40 mg tablet   No No   Sig: Take 1 Tab by mouth nightly. Indications: hypercholesterolemia   tiotropium (SPIRIVA WITH HANDIHALER) 18 mcg inhalation capsule   Yes No   Sig: Take 1 Cap by inhalation daily. traMADol (ULTRAM) 50 mg tablet   Yes No   Sig: Take 50 mg by mouth every eight (8) hours as needed for Pain. Facility-Administered Medications: None       Past Medical History:   Diagnosis Date    Anemia     Chronic obstructive pulmonary disease with acute exacerbation ([facility] 2017    Cough 2017    H/O cardiac murmur     History of AAA (abdominal aortic aneurysm) repair     Hoarseness 2017    MI, old ,     Sinus complaint 2017    Weight gain 2017     Past Surgical History:   Procedure Laterality Date    HX AAA REPAIR      HX APPENDECTOMY  1963    HX CAROTID ENDARTERECTOMY Bilateral     HX CAROTID ENDARTERECTOMY Right 2016    HX CATARACT REMOVAL Bilateral     with IOL    HX FRACTURE TX Right     arm with plating    HX HEART CATHETERIZATION      HX LAP CHOLECYSTECTOMY  2012    HX LOBECTOMY Left 2006    lower lobe removed    HX OTHER SURGICAL  2016    what sounds like renal stent    HX OTHER SURGICAL  2017    stent placed in kidney    HX KAYLIN AND BSO      HX TUBAL LIGATION      HX UROLOGICAL  2010    stent to left kidney- to urinary bag    HX UROLOGICAL      stent inserted in right kidney during AAA surgery and was accidentely left in for 10 years.  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[employer], convenience store      Social History Main Topics    Smoking status: Former Smoker     Packs/day: 4.00     Years: 55.00     Types: Cigarettes    Smokeless tobacco: Never Used      Comment: quit smoking 12/25/2016    Alcohol use No    Drug use: No    Sexual activity: Not on file     Other Topics Concern    Not on file     Social History Narrative    Lives with daughter in law     Family History   Problem Relation Age of Onset    Cancer Father     Heart Disease Father     Cancer Brother     Heart Disease Brother     Lung Disease Brother     Heart Disease Brother      Allergies   Allergen Reactions    Combigan [Brimonidine-Timolol] Other (comments)     Eye inflammation. Eyes water and hurt    Morphine Other (comments)     \" drives me crazy\"  hallucinates       Current Facility-Administered Medications   Medication Dose Route Frequency    norepinephrine (LEVOPHED) 4 mg in dextrose 5% 250 mL infusion  2-16 mcg/min IntraVENous ONCE    0.9% sodium chloride infusion 250 mL  250 mL IntraVENous PRN    perflutren lipid microspheres (DEFINITY) in NS bolus IV  1 mL IntraVENous PRN    sodium polystyrene (KAYEXALATE) 15 gram/60 mL oral suspension 30 g  30 g Per G Tube NOW     Current Outpatient Prescriptions   Medication Sig    tiotropium (SPIRIVA WITH HANDIHALER) 18 mcg inhalation capsule Take 1 Cap by inhalation daily.  MV-MN/IRON FUM/FA/OMEGA3,6,9#3 (WOMEN'S MULTI PO) Take  by mouth.  ferrous sulfate (IRON) 325 mg (65 mg iron) cpER Take  by mouth.  montelukast (SINGULAIR) 10 mg tablet Take 10 mg by mouth daily.  traMADol (ULTRAM) 50 mg tablet Take 50 mg by mouth every eight (8) hours as needed for Pain.  polyethylene glycol (MIRALAX) 17 gram packet Take 17 g by mouth daily.  OXYGEN-AIR DELIVERY SYSTEMS 2 L by Nasal route continuous.  melatonin 1 mg tablet Take 1 mg by mouth nightly.  escitalopram oxalate (LEXAPRO) 20 mg tablet Take 1 Tab by mouth daily.  carvedilol (COREG) 12.5 mg tablet Take 1 Tab by mouth two (2) times daily (with meals).  furosemide (LASIX) 40 mg tablet Take 1 Tab by mouth two (2) times a day.  divalproex DR (DEPAKOTE) 125 mg tablet Take 1 Tab by mouth two (2) times a day.  Omeprazole delayed release (PRILOSEC D/R) 20 mg tablet Take 1 Tab by mouth every morning. Indications: GASTROESOPHAGEAL REFLUX    simvastatin (ZOCOR) 40 mg tablet Take 1 Tab by mouth nightly. Indications: hypercholesterolemia    OLANZapine (ZYPREXA) 20 mg tablet Take 0.5 Tabs by mouth nightly. Indications: ocd    hydralazine (APRESOLINE) 50 mg tablet Take 50 mg by mouth three (3) times daily. Indications: HYPERTENSION    lisinopril (PRINIVIL, ZESTRIL) 10 mg tablet Take 10 mg by mouth every morning. Indications: HYPERTENSION    metoprolol (LOPRESSOR) 50 mg tablet Take 25 mg by mouth two (2) times a day. Indications: HYPERTENSION    IRON PS CMPLX/VIT B12/FA (FERREX 150 FORTE PO) Take  by mouth every morning. Indications: anemia    aspirin (ASPIRIN) 325 mg tablet Take 325 mg by mouth every morning. Indications: MYOCARDIAL REINFARCTION PREVENTION, hold until after surgery per MD and surgeon. ok with Dr. Sherman Ontiveros fluticasone-salmeterol (ADVAIR DISKUS) 250-50 mcg/dose diskus inhaler Take 1 puff by inhalation every twelve (12) hours. Indications: BRONCHOSPASM PREVENTION WITH COPD    cyanocobalamin (VITAMIN B-12) 1,000 mcg tablet Take 1,000 mcg by mouth every morning. Indications: PREVENTION OF VITAMIN B12 DEFICIENCY    ascorbic acid (VITAMIN C) 500 mg tablet Take 500 mg by mouth every morning. Indications: VITAMIN C DEFICIENCY    albuterol (PROAIR HFA) 90 mcg/actuation inhaler Take 2 puffs by inhalation every four (4) hours as needed for Wheezing.  Indications: CHRONIC OBSTRUCTIVE PULMONARY DISEASE    nitroglycerin (NITROSTAT) 0.4 mg SL tablet 0.4 mg by SubLINGual route every five (5) minutes as needed for Chest Pain. Objective:     Vitals:    03/14/17 1434 03/14/17 1436 03/14/17 1438 03/14/17 1443   BP:    (!) 80/60   Pulse: 84 (!) 107 (!) 107    Resp: 16 29 18    SpO2:   95%        PHYSICAL EXAM     Constitutional:  the patient is obese, well developed and in no acute distress  HEENT:  Sclera clear, pupils equal, oral mucosa moist  Respiratory: harsh breath sounds, equal bilaterally. On ventilator  Cardiovascular:  RRR without M,G,R. Sinus tach per telemetry  Abd/GI: soft, obese, ? tendeness; with no bowel sounds audible. Ext: warm without cyanosis. There is no lower leg edema. Skin:  no jaundice or rashes, no wounds   Neuro: currently unresponsive with no spontaneous movement    Musculoskeletal: No deformities    Chest Xray: large R hilar abnormality. PriorCT in 1/2017 without this.         CT lung ca screening 1/20/17  Recent Labs      03/14/17   1415   WBC  14.4*   HGB  6.7*   HCT  23.8*   PLT  213       Recent Labs      03/14/17   1520   PH  7.17*   PCO2  39   PO2  262*   HCO3  14*       Assessment:  (Medical Decision Making)     Hospital Problems  Date Reviewed: 2/14/2017          Codes Class Noted POA    Acute on chronic respiratory failure ([facility] ICD-10-CM: J96.20  ICD-9-CM: 518.84  3/14/2017 Yes        * (Principal)Cardiac arrest ([facility] ICD-10-CM: I46.9  ICD-9-CM: 427.5  3/14/2017 Yes        COPD (chronic obstructive pulmonary disease) (HCC) (Chronic) ICD-10-CM: J44.9  ICD-9-CM: 218  3/14/2017 Yes    Overview Signed 3/14/2017  3:37 PM by Dasha Ortega NP     Spirometry:    FVC: 1.82 L% Pred: 63 %    FEV1: 0.98 L% Pred: 45 %    FEV1/FVC: 54 %    FEF25%-75%: 0.28 L/sec% Pred: 16 %        Spirometry (post):    FVC (post): 1.89 L (4 puffs of albuterol given)% Pred: 65 %    FEV1 (post): 1.1 L% Pred: 50 %    FEV1/FVC (post): 58 %    WEK58-62% (post): 0.37 L/sec% Pred: 21 %        Lung Volumes:    TLC: 3.73 L% Pred: 73 %        RV: 1.91 L% Pred: 84 %    RV/T %        Diffusion Capacity:    DLCO: 9.6 mL/mmHg/min% Pred: 48 %               Anemia ICD-10-CM: D64.9  ICD-9-CM: 285.9  3/14/2017 Yes        Hyperkalemia ICD-10-CM: E87.5  ICD-9-CM: 276.7  3/14/2017 Yes        CKD (chronic kidney disease) (Chronic) ICD-10-CM: N18.9  ICD-9-CM: 585.9  2017 Yes              Plan:  (Medical Decision Making)   1. Admit to ICU/CCU. Discussed with family - son and daughter are at the bedside and they do not want anymore CPR. They state that patient did not want to be on a machine and that she would not want prolonged life support  2. Place NG and assess for GI bleeding. Will transfuse now and check serial hemoglobins. Has 2 peripheral IVs in place - we will place central line  3. Cardiology has seen - bedside echo done - EF estimated at 50%. EKG without ST changes and no RV failure  4. Use Levophed/IV fluids for blood pressure support  5. Bronchodilators for COPD/ full vent support for now  6. Review CXR when available. Labs still pending as well  7. Repeat ABG when stable - still acidotic per ABG. Will get CT of the abdomen - noncontrast - for now  8. Kayexalate for hyperkalemia  9. Avoid sedatives if possible - monitor neuro status    Pascual Rutledge NP     I have spoken with and examined the patient. I agree with the above assessment and plan as documented. Mrs. Jhonatan Friend is a 63yoF with a PMH of COPD on home oxygen, CAD s/p MI x 3, bilateral carotid endarterectomies, s/p AAA repair, surgical removal of right kidney with CKD, chronic smoking, CHF, h/o lung cancer s/p left lobectomy who is admitted to  ICU s/p arrest (downtime 10 min, 15 min CPR before ROSC). She was found to be in asystole upon EMS arrival.  She had two additional episodes of bradycardia leading to asystole in the ER here. Gen: unresponsive and intubated.   Ashen  Lungs:  Clear to auscultation  Heart:  RRR with no Murmur/Rubs/Gallops  Abd: mild distention with decreased BA  Ext:no edema    --Admit to ICU to continue supportive care.    --Family to arrive to assess goals of care  --CT C/A/P noncontrast to try to assess etiology of arrest  --Levophed, IVF, will need central line   --Vanco/Zosyn empirically  --f/u labs (reportedly all sent by ER)    Aleksandr Calloway MD      More than 50% of time documented was spent face-to-face contact with the patient and in the care of the patient on the floor/unit where the patient is located

## 2017-03-15 NOTE — PROGRESS NOTES
HR 90s on monitor, BP too low to read on monitor, family aware, pressors continue, but no further measures will be added due to patient now being on comfort measures only. O2 sat still unable to read on monitor, patient is breathing at a rate above the ventilator. Femoral pulse present by doppler, faint. Cleaned of incontinent stool and given morphine and versed per orders for comfort. Family remains at bedside with bedside monitor off. Will continue to monitor.

## 2017-03-15 NOTE — PROGRESS NOTES
Family ready to compassionately extubate. Medicated with 5mg morphine per orders. Extubated per RT. Patient apneic. HR dropping. Son and other family at bedside. Will continue to monitor.

## 2017-03-15 NOTE — PROGRESS NOTES
Now on double pressors. Family back with patient for now. Told condition critical and will continue to monitor. Will not place in new lines e.g. Arterial given took over one hour to get central line in for the patient. 2 sons and female family member at side and aware of all of the above and agree with the current plan.     Orly Cordova MD

## 2017-03-15 NOTE — PROGRESS NOTES
[place] Alert came and now will stop drip and then compassionately extubate later today    Philip Peters MD

## 2017-03-15 NOTE — DISCHARGE SUMMARY
Death Summary    Rafal Ambrosio  Admission date:  3/14/2017  Discharge date:  3/15/17    Admitting Diagnosis:  Cardiac arrest [facility])  Discharge Diagnosis:    Problem List as of 3/15/2017  Date Reviewed: 2017          Codes Class Noted - Resolved    * (Principal)Cardiac arrest [facility]) ICD-10-CM: I46.9  ICD-9-CM: 427.5  3/14/2017 - Present    --multiple    Acute on chronic respiratory failure ([facility] ICD-10-CM: J96.20  ICD-9-CM: 518.84  3/14/2017 - Present        Septic shock ([facility] ICD-10-CM: A41.9, R65.21  ICD-9-CM: 038.9, 785.52, 995.92  3/14/2017 - Present    With multiple organ failure    Acute on chronic renal failure (HCC) ICD-10-CM: N17.9, N18.9  ICD-9-CM: 584.9, 585.9  3/14/2017 - Present        COPD (chronic obstructive pulmonary disease) (HCC) (Chronic) ICD-10-CM: J44.9  ICD-9-CM: 496  3/14/2017 - Present    Overview Signed 3/14/2017  3:37 PM by Dolly Fortune NP     Spirometry:    FVC: 1.82 L% Pred: 63 %    FEV1: 0.98 L% Pred: 45 %    FEV1/FVC: 54 %    FEF25%-75%: 0.28 L/sec% Pred: 16 %        Spirometry (post):    FVC (post): 1.89 L (4 puffs of albuterol given)% Pred: 65 %    FEV1 (post): 1.1 L% Pred: 50 %    FEV1/FVC (post): 58 %    LAJ46-15% (post): 0.37 L/sec% Pred: 21 %        Lung Volumes:    TLC: 3.73 L% Pred: 73 %          RV: 1.91 L% Pred: 84 %    RV/T %        Diffusion Capacity:    DLCO: 9.6 mL/mmHg/min% Pred: 48 %               Anemia ICD-10-CM: D64.9  ICD-9-CM: 285.9  3/14/2017 - Present        Mass of right lung ICD-10-CM: R91.8  ICD-9-CM: 786.6  3/14/2017 - Present        Pulmonary hypertension, mild (HCC) (Chronic) ICD-10-CM: I27.2  ICD-9-CM: 416.8  Unknown - Present    Overview Signed 3/14/2017  3:24 PM by Dolly Fortune NP     per echo              Aortic stenosis, mild (Chronic) ICD-10-CM: I35.0  ICD-9-CM: 424.1  Unknown - Present    Overview Signed 3/14/2017  3:25 PM by Dolly Fortune NP     per echo              Hyperkalemia ICD-10-CM: E87.5  ICD-9-CM: 276.7 3/14/2017 - Present        Obesity (BMI 30-39.9) (Chronic) ICD-10-CM: E66.9  ICD-9-CM: 278.00  3/14/2017 - Present        Metabolic acidosis IWT-16-BC: E87.2  ICD-9-CM: 276.2  3/14/2017 - Present                Weakness (Chronic) ICD-10-CM: R53.1  ICD-9-CM: 780.79  2017 - Present        Chronic fatigue (Chronic) ICD-10-CM: R53.82  ICD-9-CM: 780.79  2017 - Present        History of lung cancer (Chronic) ICD-10-CM: Z85.118  ICD-9-CM: V10.11  2017 - Present                  Consultants:  Cardiology   [facility]    Studies/Procedures:  Ct chest  CT abd pelvis    Hospital course:    Patient with COPD and oxygen dependent with piror lung CA in  s/p resection/CAD with severe PVD BIBA from home where she collapsed to her knees. Her daughter in law was home and witnessed her going down so called EMS. The daughter in law could not see any respiratory efforts so CPR was started with the direction of the . Downtime was estimated to be 10 minutes before EMS arrived. She was found to be in asystole so was intubated. CPR was continued for about 15 minutes before ROSC. In ER had 2 more episodes that required CPR and started on pressors and transferred to ICU. Here continue to decline and additional pressors/bicarb added. Patient family here and worsening pH and had difficulty even placing in access. Patient's family decided to perform compassionate extubation and patient  at 3:04 AM. Web death completed    Final:  --Pronounced dead at 3:04 AM  --Total discharge greater than 30 minutes in duration.     Karen Verdin MD

## 2017-03-15 NOTE — PROGRESS NOTES
Offered prayer and support to patient's family prior to extubation  Present for family at time of death    Peggy Benoit III, PhD  Jakub Rendon  [phone]

## 2017-03-15 NOTE — PROGRESS NOTES
CT noted with dense pneumonia -- multilobular on Right.   cxr post line noted Right IJV in place with ?skin fold on right. F/u official report  abg noted and rate and vent adjusted. Was being bagged with ambu bag prior to arterial gas.      Amanda Lazo MD

## 2017-03-15 NOTE — PROGRESS NOTES
Family here and report that they would like to take the patient off the vent compsionatly. They report she never wanted to be on a vent. Also patient not sedated since admission and still on double pressors without change. Offered to get rachel and they agree. Then told would start by stopping drips then removing patient from ventilatory compassionately. They are all in agreement. Nurse, Gregory Grew aware of plan.     Kumar Espinoza MD

## 2017-03-15 NOTE — PROGRESS NOTES
Ventilator check complete; patient has a #7. 0 ET tube secured at the 24 at the lip. Patient is not sedated. Patient is not able to follow commands. Breath sounds are coarse. Trachea is midline, Negative for subcutaneous air, and chest excursion is symmetric. Patient is also Negative for cyanosis and is Negative for pitting edema. All alarms are set and audible. Resuscitation bag is at the head of the bed.       Ventilator Settings  Mode FIO2 Rate Tidal Volume Pressure PEEP I:E Ratio   PRVC  50 %    450 ml     8 cm H20  1:3.70      Peak airway pressure: 33.4 cm H2O   Minute ventilation: 7.5 l/min     ABG:   Recent Labs      03/14/17   1910  03/14/17   1520   PH  7.48*  7.17*   PCO2  18*  39   PO2  415*  262*   HCO3  13*  14*         Leida Liu, RT

## 2017-03-15 NOTE — PROGRESS NOTES
Patient  at 3:04 AM and family here. Outcome expected. No pulse or spontaneous respiration.  Death summary and web death to follow  Rush Galvez MD

## 2017-03-15 NOTE — PROGRESS NOTES
Ventilator turned off and patient compassionately extubated per families request. Family and Good RN at bedside.

## 2017-03-15 NOTE — PROGRESS NOTES
[place] met with family, family ready to stop drips. Drips stopped and patient medicated for comfort per orders. Will continue to monitor.

## 2017-03-15 NOTE — PROGRESS NOTES
Patient not responding to treatment. Not add any new medications. Aware when drips stop will not continue. Aware patient is dying. Last venous gas just drawn and noted pH is 7.1 again from 7.4 earlier. Family is with patient and understands all of the above. Will given morphine for comfort, aware S.E. Is hallucination. Add versed as well.     Marlo Bailey MD

## 2017-03-15 NOTE — PROCEDURES
Indication: shock, cardiac arrest    Time Out done and Correct patient for procedure. Using US noted left IJV. Area sterilized and tried to place and felt very hard to place in [employer] so attempt stopped. Then RIJV area sterilized and placed with US but noted excessive skin folds and had to go thru 2 area with some bleeding. All ports with blood return, with guidewire removed. Then tried Right groin and not able to pass dilator. So went back to RIJV area and sutured in place for now since no access. CXR done and line in place. Pro Young MD    Electronically signed.

## 2017-03-15 NOTE — PROGRESS NOTES
On max double pressors and BP not registering. Will get venous blood gas. Checked with dopper and faint left femoral pulse. Family aware patient is dying and will likely not survive the night.   Rush Galvez MD

## 2017-03-16 LAB
ABO + RH BLD: NORMAL
ANTIGENS PRESENT BLD: NORMAL
ANTIGENS PRESENT RBC DONR: NORMAL
BLD PROD TYP BPU: NORMAL
BLOOD BANK CMNT PATIENT-IMP: NORMAL
BLOOD GROUP ANTIBODIES SERPL: NORMAL
BLOOD GROUP ANTIBODIES SERPL: NORMAL
BPU ID: NORMAL
CROSSMATCH RESULT,%XM: NORMAL
SPECIMEN EXP DATE BLD: NORMAL
STATUS OF UNIT,%ST: NORMAL
UNIT DIVISION, %UDIV: 0